# Patient Record
Sex: FEMALE | Race: WHITE | Employment: OTHER | URBAN - METROPOLITAN AREA
[De-identification: names, ages, dates, MRNs, and addresses within clinical notes are randomized per-mention and may not be internally consistent; named-entity substitution may affect disease eponyms.]

---

## 2018-03-01 ENCOUNTER — HOSPITAL ENCOUNTER (OUTPATIENT)
Dept: PHYSICAL THERAPY | Age: 83
Discharge: HOME | End: 2018-03-01
Payer: MEDICARE

## 2018-03-01 PROCEDURE — 97161 PT EVAL LOW COMPLEX 20 MIN: CPT

## 2018-03-01 PROCEDURE — G8978 MOBILITY CURRENT STATUS: HCPCS

## 2018-03-01 PROCEDURE — G8979 MOBILITY GOAL STATUS: HCPCS

## 2018-03-01 NOTE — PROGRESS NOTES
[address] PHYSICAL THERAPY   [facility], [address],[place], [address] - Phone: [phone]  Fax: 31-01-08-40 OF [facility] / [address] [facility]  Patient Name: Maya Carrillo : 1935   Medical   Diagnosis: Vestibular disorder [H81.90] Treatment Diagnosis: Vestibular disorder [H81.90], unsteadiness on feet [R26.81]   Onset Date: 1-2 years     Referral Source: Don Bonilla MD Start of Care [facility]): 3/1/2018   Prior Hospitalization: See medical history Provider #: [id number]   Prior Level of Function: Pt reports \"I walked normal\" prior to onset. No AD. Comorbidities: Arthritis, L TKA , HTN, h/o ovarian CA    Medications: Verified on Patient Summary List   The Plan of Care and following information is based on the information from the initial evaluation.   ===========================================================================================  Assessment / key information:  Pt is a [de-identified]y.o. year old female with subjective complaints of difficulty walking of insidious onset that started about 1-2 years ago. \"I just can't walk right\". Pt states she does not want to have to walk with AD. Pain is not a problem for which she is seeking treatment. Denies dizziness, vertigo, nausea, diplopia, tinnitus, paresthesias. Current functional limitations: walking, household chores. FOTO score= 64/100 indicating 36% impairment to functional activities. Today's evaulation is significant for   1) gait impairments: widened KEENAN, decreased gait speed, decreased eccentric loading and push off b/l; increased b/l trunk sway. 2) Impaired standing balance: Static:  Romberg EO/EC: floor= 30/3\", foam= 1/0\". SLS floor R 3\", L 1\". Dynamic: DGI=18/24 (min fall risk). 3) Impaired strength:  Hip flex R 3-/5, L 3-/5; abd R 2+/5, L 3-/5; Knee Ext R 4/5, L 4/5; flex R 4-/5, L 4-/5;  Ankle DF R 5/5, L 5/5.    4) Oculo-motor exam is grossly WNL.  (-) smooth pursuit, convergence, VOR, saccades. These findings are supportive for diagnosis of generalized balance impairment. Pt will be a good candidate for skilled PT to address these impairments and promote return to normal ADLs and functional mobility for improved quality of life.    ===========================================================================================  Eval Complexity: History MEDIUM  Complexity : 1-2 comorbidities / personal factors will impact the outcome/ POC ;  Examination  MEDIUM Complexity : 3 Standardized tests and measures addressing body structure, function, activity limitation and / or participation in recreation ; Presentation LOW Complexity : Stable, uncomplicated ;  Decision Making MEDIUM Complexity : FOTO score of 26-74; Overall Complexity LOW   Problem List: decrease strength, impaired gait/ balance, decrease ADL/ functional abilitiies, decrease activity tolerance, decrease flexibility/ joint mobility and decrease transfer abilities   Treatment Plan may include any combination of the following: Therapeutic exercise, Therapeutic activities, Neuromuscular re-education, Physical agent/modality, Gait/balance training, Manual therapy, Patient education and Functional mobility training  Patient / Family readiness to learn indicated by: asking questions, trying to perform skills and interest  Persons(s) to be included in education: patient (P)  Barriers to Learning/Limitations: yes; Language; Native Italian speaking  Measures taken:    Patient Goal (s): \"better balance\"   Patient self reported health status: good  Rehabilitation Potential: good   Short Term Goals: To be accomplished in  2  weeks:  1. Pt will be educated in appropriate HEP to improve LE strength/stability for amb, standing, and transfers. 2.  Patient will report at least 25% functional improvement with standing, walking ADL's.      Long Term Goals: To be accomplished in  4-6  weeks:  1.  Pt will improve FOTO score to >/= 74 to demo a significant improvement in functional activity tolerance. 2. Pt will achieve >/= +4 GROC in order to promote increased activity tolerance. 3. Patient to demonstrate >/=5 sec of b/ SLS on firm surface in order to improve ease with ambulation on uneven terrains. 4. Pt will increase DGI to >/= 20/24 in order to reduce risk for fall with ambulation. Frequency / Duration:   Patient to be seen  2  times per week for 4-6  weeks:  Patient / Caregiver education and instruction: activity modification and exercises  G-Codes (GP): Mobility [id number] Current  CJ= 20-39%   Goal  CJ= 20-39%. The severity rating is based on the FOTO Score  Therapist Signature: Elicia Ferris, PT Date: 7/6/7231   Certification Period: 03/01/18 to 5/30/2018 Time: 1:50 PM   ===========================================================================================  I certify that the above Physical Therapy Services are being furnished while the patient is under my care. I agree with the treatment plan and certify that this therapy is necessary. Physician Signature:        Date:       Time:     Please sign and return to [facility], [facility]. or you may fax the signed copy to [phone]. Thank you.

## 2018-03-01 NOTE — PROGRESS NOTES
PHYSICAL THERAPY - DAILY TREATMENT NOTE    Patient Name: Williams Jonas        Date: 3/1/2018  : 1935   yes Patient  Verified  Visit #:   1     Insurance: Payor: /      In time: 309 Out time: 340   Total Treatment Time: 31     Medicare Time Tracking (below)   Total Timed Codes (min):  31 1:1 Treatment Time:  31     TREATMENT AREA =  Vestibular disorder [H81.90]    SUBJECTIVE  Pain Level (on 0 to 10 scale):  5-neck  / 10   Medication Changes/New allergies or changes in medical history, any new surgeries or procedures?    no  If yes, update Summary List   Subjective Functional Status/Changes:  []  No changes reported     See POC          OBJECTIVE    See exam on chart for details on objective findings           min Patient Education:  yes   Pt educated in balance system anatomy, function and dysfunction as related to diagnosis. Reviewed POC and goals. Pt reports understanding. []  Progressed/Changed HEP based on: Other Objective/Functional Measures:    See POC     Post Treatment Pain Level (on 0 to 10) scale:   5  / 10     ASSESSMENT  Assessment/Changes in Function:     See POC     []  See Progress Note/Recertification   Patient will continue to benefit from skilled PT services to modify and progress therapeutic interventions, address functional mobility deficits, address ROM deficits, address strength deficits and address imbalance/dizziness to attain remaining goals. Progress toward goals / Updated goals:    See POC     PLAN  []  Upgrade activities as tolerated yes Continue plan of care   []  Discharge due to :    []  Other:      Therapist: Smita Jasso.  Jermaine Belcher, PT    Date: 3/1/2018 Time: 1:49 PM     Future Appointments  Date Time Provider Ivon Hall   3/7/2018 11:00 AM Gil Rascon, PT [facility]   3/9/2018 11:00 AM Gil Rascon, PT [facility]   3/14/2018 10:30 AM Gil Rascon, PT [facility]   3/16/2018 10:30 AM Gil Rascon, PT [facility]   3/19/2018 3:00 PM Gil Degroot Tarah, PT [facility]   3/21/2018 3:00 PM Mercedes Velasco, PT [facility]   3/26/2018 11:30 AM Brooks Rascon, PT [facility]   3/29/2018 11:00 AM Brooks Rascon, PT [facility]         \

## 2018-03-07 ENCOUNTER — HOSPITAL ENCOUNTER (OUTPATIENT)
Dept: PHYSICAL THERAPY | Age: 83
Discharge: HOME | End: 2018-03-07
Payer: MEDICARE

## 2018-03-07 PROCEDURE — 97112 NEUROMUSCULAR REEDUCATION: CPT

## 2018-03-07 NOTE — PROGRESS NOTES
PHYSICAL THERAPY - DAILY TREATMENT NOTE    Patient Name: Aden Wang        Date: 3/7/2018  : 1935   yes Patient  Verified  Visit #:     Insurance: Payor: Jelly Hsieh / Plan: [place] MED[place] & B / Product Type: Medicare /      In time: 1100 Out time: 1130   Total Treatment Time: 30     Medicare Time Tracking (below)   Total Timed Codes (min):  30 1:1 Treatment Time:  30     TREATMENT AREA =  Unsteadiness on feet [R26.81]    SUBJECTIVE  Pain Level (on 0 to 10 scale):  2  / 10   Medication Changes/New allergies or changes in medical history, any new surgeries or procedures?    no  If yes, update Summary List   Subjective Functional Status/Changes:  []  No changes reported     \"I feel the same as I always do\"          OBJECTIVE    30 min Neuromuscular Re-ed: SEE FLOW SHEET   Rationale:    improve coordination, increase proprioception and balance to improve the patients ability to perform functional ADL's         min Patient Education:  yes  Reviewed HEP   []  Progressed/Changed HEP based on: Other Objective/Functional Measures:    Initiated therex today per flow sheet, requiring vc and demo 100% of the time for proper form and technique. Post Treatment Pain Level (on 0 to 10) scale:   2  / 10     ASSESSMENT  Assessment/Changes in Function:     Required moderate encouragement to perform standing balance and therex. Pt continues to have most difficulty with standing and walking balance as well as with foam. Will continue to progress therex within current POC as patient is able.        []  See Progress Note/Recertification   Patient will continue to benefit from skilled PT services to modify and progress therapeutic interventions, address functional mobility deficits, address ROM deficits, address strength deficits, analyze and address soft tissue restrictions, analyze and cue movement patterns, analyze and modify body mechanics/ergonomics, assess and modify postural abnormalities and address imbalance/dizziness to attain remaining goals.    Progress toward goals / Updated goals:    First visit since IE     PLAN  []  Upgrade activities as tolerated yes Continue plan of care   []  Discharge due to :    []  Other:      Therapist: Calderon Giraldo PT, DPT    Date: 3/7/2018 Time: 8:54 AM     Future Appointments  Date Time Provider Ivon Isabel   3/7/2018 11:00 AM Derrick Rascon, PT [facility]   3/9/2018 11:00 AM Derrick Rascon, PT [address] [place] [address]   3/14/2018 10:30 AM Derrick Rascon, PT [facility]   3/16/2018 10:30 AM Derrick Rascon, PT [facility]   3/19/2018 3:00 PM Derrick Rascon, PT [facility]   3/21/2018 3:00 PM Derrick Rascon, PT [facility]   3/26/2018 11:30 AM Derrick Rascon, PT [facility]   3/29/2018 11:00 AM Derrick Rascon, PT [facility]

## 2018-03-09 ENCOUNTER — HOSPITAL ENCOUNTER (OUTPATIENT)
Dept: PHYSICAL THERAPY | Age: 83
Discharge: HOME | End: 2018-03-09
Payer: MEDICARE

## 2018-03-09 PROCEDURE — 97110 THERAPEUTIC EXERCISES: CPT

## 2018-03-09 PROCEDURE — 97112 NEUROMUSCULAR REEDUCATION: CPT

## 2018-03-09 NOTE — PROGRESS NOTES
PHYSICAL THERAPY - DAILY TREATMENT NOTE    Patient Name: Chemo Doan        Date: 3/9/2018  : 1935   yes Patient  Verified  Visit #:  3  of   12  Insurance: Payor: Azam Boone / Plan: [place] MEDICARE PART A & B / Product Type: Medicare /      In time: 1100 Out time: 1130   Total Treatment Time: 30     Medicare Time Tracking (below)   Total Timed Codes (min):  30 1:1 Treatment Time: 30     TREATMENT AREA =  Unsteadiness on feet [R26.81]    SUBJECTIVE  Pain Level (on 0 to 10 scale): 0  / 10   Medication Changes/New allergies or changes in medical history, any new surgeries or procedures?    no  If yes, update Summary List   Subjective Functional Status/Changes:  []  No changes reported     \"I feel line\"         OBJECTIVE    30 min Neuromuscular Re-ed: SEE FLOW SHEET   Rationale:    improve coordination, increase proprioception and balance to improve the patients ability to perform functional ADL's         min Patient Education:  yes  Reviewed HEP   []  Progressed/Changed HEP based on: Other Objective/Functional Measures:    Pt was able to add standing rom on the foam with EC today. Required CGA for all balance and walking therex today. Will continue to progress therex within current POC as patient is able. Post Treatment Pain Level (on 0 to 10) scale:  0  / 10     ASSESSMENT  Assessment/Changes in Function:     Pt denies any sharp pains or red flags following tx         []  See Progress Note/Recertification   Patient will continue to benefit from skilled PT services to modify and progress therapeutic interventions, address functional mobility deficits, address ROM deficits, address strength deficits, analyze and address soft tissue restrictions, analyze and cue movement patterns, analyze and modify body mechanics/ergonomics, assess and modify postural abnormalities and address imbalance/dizziness to attain remaining goals.    Progress toward goals / Updated goals:         PLAN  []  Upgrade activities as tolerated yes Continue plan of care   []  Discharge due to :    []  Other:      Therapist: Nicholas Clay, PT, DPT    Date: 3/9/2018 Time: 8:54 AM     Future Appointments  Date Time Provider Ivon Hall   3/9/2018 11:00 AM Feliberto Rascon PT [facility]   3/14/2018 10:30 AM Feliberto Rascon PT [address] [place] [address]   3/16/2018 10:30 AM Feliberto Rascon PT [facility]   3/19/2018 3:00 PM Feliberto Rascon PT [facility]   3/21/2018 3:00 PM Pauline Pickard, PT [facility]   3/26/2018 11:30 AM Feliberto Rascon PT [facility]   3/29/2018 11:00 AM Feliberto Rascon PT [facility]

## 2018-03-14 ENCOUNTER — HOSPITAL ENCOUNTER (OUTPATIENT)
Dept: PHYSICAL THERAPY | Age: 83
Discharge: HOME | End: 2018-03-14
Payer: MEDICARE

## 2018-03-14 PROCEDURE — 97112 NEUROMUSCULAR REEDUCATION: CPT

## 2018-03-14 NOTE — PROGRESS NOTES
PHYSICAL THERAPY - DAILY TREATMENT NOTE    Patient Name: Jamey Reyes        Date: 3/14/2018  : 1935   yes Patient  Verified  Visit #:    Insurance: Payor: Bakari Gurrola / Plan: [place] MEDICARE PART A & B / Product Type: Medicare /      In time: 9891 Out time:    Total Treatment Time: 25     Medicare Time Tracking (below)   Total Timed Codes (min):  25 1:1 Treatment Time: 25     TREATMENT AREA =  Unsteadiness on feet [R26.81]    SUBJECTIVE  Pain Level (on 0 to 10 scale): 0 / 10   Medication Changes/New allergies or changes in medical history, any new surgeries or procedures?    no  If yes, update Summary List   Subjective Functional Status/Changes:  []  No changes reported     \"I feel the same\"         OBJECTIVE    25 min Neuromuscular Re-ed: SEE FLOW SHEET   Rationale:    improve coordination, increase proprioception and balance to improve the patients ability to perform functional ADL's         min Patient Education:  yes  Reviewed HEP   []  Progressed/Changed HEP based on: Other Objective/Functional Measures:    See flow sheet for therex         Post Treatment Pain Level (on 0 to 10) scale:  0 / 10     ASSESSMENT  Assessment/Changes in Function:     Pt continues to have decreased awareness of balance issues and continues to make no noted adjustments with both verbal and tactile cuing. Pt was educated on importance of balance and gait, mainly head turns at this time and continued to no perform head turns like educated. Will continue to progress therex within current POC as patient is able.            []  See Progress Note/Recertification   Patient will continue to benefit from skilled PT services to modify and progress therapeutic interventions, address functional mobility deficits, address ROM deficits, address strength deficits, analyze and address soft tissue restrictions, analyze and cue movement patterns, analyze and modify body mechanics/ergonomics, assess and modify postural abnormalities and address imbalance/dizziness to attain remaining goals.    Progress toward goals / Updated goals:         PLAN  []  Upgrade activities as tolerated yes Continue plan of care   []  Discharge due to :    []  Other:      Therapist: Ciara Gracia PT, DPT    Date: 3/14/2018 Time: 11:00     Future Appointments  Date Time Provider Ivon Hall   3/14/2018 10:30 AM James Rascon PT [facility]   3/16/2018 10:30 AM James Rascon PT [address] [place] [address]   3/19/2018 3:00 PM James Rascon PT [facility]   3/21/2018 3:00 PM Andreia Ardon, PT [facility]   3/26/2018 11:30 AM James Rascon PT [facility]   3/29/2018 11:00 AM James Rascon PT [facility]

## 2018-03-16 ENCOUNTER — HOSPITAL ENCOUNTER (OUTPATIENT)
Dept: PHYSICAL THERAPY | Age: 83
Discharge: HOME | End: 2018-03-16
Payer: MEDICARE

## 2018-03-16 PROCEDURE — 97112 NEUROMUSCULAR REEDUCATION: CPT

## 2018-03-16 NOTE — PROGRESS NOTES
PHYSICAL THERAPY - DAILY TREATMENT NOTE    Patient Name: April Loreta        Date: 3/16/2018  : 1935   yes Patient  Verified  Visit #:    Insurance: Payor: Alejandro Macdonald / Plan: [place] MEDICARE PART A & B / Product Type: Medicare /      In time:  Out time:    Total Treatment Time: 28     Medicare Time Tracking (below)   Total Timed Codes (min):  28 1:1 Treatment Time: 28     TREATMENT AREA =  Unsteadiness on feet [R26.81]    SUBJECTIVE  Pain Level (on 0 to 10 scale): 0 / 10   Medication Changes/New allergies or changes in medical history, any new surgeries or procedures?    no  If yes, update Summary List   Subjective Functional Status/Changes:  []  No changes reported     \"I dont feel no different. I have to still do all my stuff at home. I dont have a choice\"         OBJECTIVE    28 min Neuromuscular Re-ed: SEE FLOW SHEET   Rationale:    improve coordination, increase proprioception and balance to improve the patients ability to perform functional ADL's         min Patient Education:  yes  Reviewed HEP   []  Progressed/Changed HEP based on: Other Objective/Functional Measures:    Max cuing required for proper form with nolvia negotiation today  Continues to have no change in head turns, even with max verbal cuing. Most decrease in balance with vertical head motions with moderate deviation from path       Post Treatment Pain Level (on 0 to 10) scale:  0 / 10     ASSESSMENT  Assessment/Changes in Function:     Pt reports no noted changes in balance over the last 5 visit. Pt was educated extensively on the possible use of a cane for improved safety with dynamic gait activities, however was addiment about not wanting to use a cane. Pt was also educated on importance of HEP for LE strengthening to improve balance and curb negotiation etc. Pt reported HEP \"maybe 1x/day if I can\".  Pt will be re-evaluated [place] and will likely be DC in the upcoming weeks due to minimal changes towards goals and balance deficits at this time. []  See Progress Note/Recertification   Patient will continue to benefit from skilled PT services to modify and progress therapeutic interventions, address functional mobility deficits, address ROM deficits, address strength deficits, analyze and address soft tissue restrictions, analyze and cue movement patterns, analyze and modify body mechanics/ergonomics, assess and modify postural abnormalities and address imbalance/dizziness to attain remaining goals.    Progress toward goals / Updated goals:         PLAN  []  Upgrade activities as tolerated yes Continue plan of care   []  Discharge due to :    []  Other:      Therapist: Lamin Langley PT, DPT    Date: 3/16/2018 Time: 10:30AM     Future Appointments  Date Time Provider Ivon Hall   3/16/2018 10:30 AM Darlin Rascon, PT [facility]   3/19/2018 3:00 PM Darlin Rascon, PT [facility]   3/21/2018 3:00 PM Saulo Perea, PT [facility]   3/26/2018 11:30 AM Darlin Rascon, PT [facility]   3/29/2018 11:00 AM Darlin Rascon, PT [facility]

## 2018-03-19 ENCOUNTER — HOSPITAL ENCOUNTER (OUTPATIENT)
Dept: PHYSICAL THERAPY | Age: 83
Discharge: HOME | End: 2018-03-19
Payer: MEDICARE

## 2018-03-19 PROCEDURE — 97110 THERAPEUTIC EXERCISES: CPT

## 2018-03-19 PROCEDURE — 97112 NEUROMUSCULAR REEDUCATION: CPT

## 2018-03-19 NOTE — PROGRESS NOTES
PHYSICAL THERAPY - DAILY TREATMENT NOTE    Patient Name: Kristel Teran        Date: 3/19/2018  : 1935   yes Patient  Verified  Visit #:    Insurance: Payor: Ajay Palma / Plan: [place] MEDICARE PART A & B / Product Type: Medicare /      In time: 250 Out time: 330   Total Treatment Time: 40     Medicare Time Tracking (below)   Total Timed Codes (min):  40 1:1 Treatment Time: 40     TREATMENT AREA =  Unsteadiness on feet [R26.81]    SUBJECTIVE  Pain Level (on 0 to 10 scale): 0 / 10   Medication Changes/New allergies or changes in medical history, any new surgeries or procedures?    no  If yes, update Summary List   Subjective Functional Status/Changes:  []  No changes reported     \"I think I may be getting better I dont know\"         OBJECTIVE    10 min Therapeutic Exercise:  []  See flow sheet :   Rationale: increase strength and improve balance  to improve the patients ability to perform functional ADL's    30 min Neuromuscular Re-ed: Reassessment of DGI, SLS, and goals. See flow sheet. Rationale:    improve coordination, increase proprioception and balance to improve the patients ability to perform functional ADL's       min Patient Education:  yes  Reviewed HEP   []  Progressed/Changed HEP based on: Other Objective/Functional Measures: FOTO: ND  GROC: ND  DGI: 18/24  SLS: 2 seconds with UE support       Post Treatment Pain Level (on 0 to 10) scale:  0 / 10     ASSESSMENT  Assessment/Changes in Function:     Main 1:1 time in therapy today spent performing reassessment of DGI and assessing goals for progress at this time. See below. Pt continues to state \"I feel about the same. I still have to do what I have to do at home. \" Pt was given updated pictures and detailed explanation of HEP and verbalized understanding of needing to perform balance in corner with chair in front for increased safety.  WIll likely be seen 3 remaining visits then DC.            []  See Progress Note/Recertification Patient will continue to benefit from skilled PT services to modify and progress therapeutic interventions, address functional mobility deficits, address ROM deficits, address strength deficits, analyze and address soft tissue restrictions, analyze and cue movement patterns, analyze and modify body mechanics/ergonomics, assess and modify postural abnormalities and address imbalance/dizziness to attain remaining goals. Progress toward goals / Updated goals: · Short Term Goals: To be accomplished in  2  weeks:  1. Pt will be educated in appropriate HEP to improve LE strength/stability for amb, standing, and transfers- goal progressing  2. Patient will report at least 25% functional improvement with standing, walking ADL's- goal not met, pt denies any changes in balance at this time and states \"it feels about the same\"     · Long Term Goals: To be accomplished in  4-6  weeks:  1. Pt will improve FOTO score to >/= 74 to demo a significant improvement in functional activity tolerance- will assess at DC  2. Pt will achieve >/= +4 GROC in order to promote increased activity tolerance- will assess at DC  3. Patient to demonstrate >/=5 sec of b/ SLS on firm surface in order to improve ease with ambulation on uneven terrains- goal not met, 2 seconds   4.  Pt will increase DGI to >/= 20/24 in order to reduce risk for fall with ambulation- goal not met 18/24          PLAN  []  Upgrade activities as tolerated yes Continue plan of care   []  Discharge due to :    []  Other:      Therapist: Ugo Hook, PT, DPT    Date: 3/19/2018 Time: 3:30PM     Future Appointments  Date Time Provider Ivon Hall   3/19/2018 3:00 PM Waqar Rascon, PT [facility]   3/21/2018 3:00 PM Mili Escobar PT [facility]   3/26/2018 11:30 AM Waqar Rascon PT [facility]   3/29/2018 11:00 AM Waqar Rascon, PT [facility]

## 2018-03-21 ENCOUNTER — HOSPITAL ENCOUNTER (OUTPATIENT)
Dept: PHYSICAL THERAPY | Age: 83
Discharge: HOME | End: 2018-03-21
Payer: MEDICARE

## 2018-03-21 PROCEDURE — 97112 NEUROMUSCULAR REEDUCATION: CPT

## 2018-03-21 PROCEDURE — 97110 THERAPEUTIC EXERCISES: CPT

## 2018-03-21 NOTE — PROGRESS NOTES
PHYSICAL THERAPY - DAILY TREATMENT NOTE    Patient Name: Aram Sosa        Date: 3/21/2018  : 1935   yes Patient  Verified  Visit #:    Insurance: Payor: Enedelia Flaherty / Plan: [place] / Product Type: Medicare /      In time: 300 Out time: 334   Total Treatment Time: 34     Medicare Time Tracking (below)   Total Timed Codes (min):  34 1:1 Treatment Time: 34     TREATMENT AREA =  Unsteadiness on feet [R26.81]    SUBJECTIVE  Pain Level (on 0 to 10 scale): 0 / 10   Medication Changes/New allergies or changes in medical history, any new surgeries or procedures?    no  If yes, update Summary List   Subjective Functional Status/Changes:  []  No changes reported     \"I feel fine\"         OBJECTIVE    10 min Therapeutic Exercise:  []  See flow sheet :   Rationale: increase strength and improve balance  to improve the patients ability to perform functional ADL's    24 min Neuromuscular Re-ed:  See flow sheet. Rationale:    improve coordination, increase proprioception and balance to improve the patients ability to perform *functional ADL's       min Patient Education:  yes  Reviewed HEP   []  Progressed/Changed HEP based on: Other Objective/Functional Measures:    See flow sheet       Post Treatment Pain Level (on 0 to 10) scale:  0 / 10     ASSESSMENT  Assessment/Changes in Function:   Continues to have decrease balance awareness and continues to present with mild plateau in therapy. Will be seen 2 visits next week then DC to HEP.       []  See Progress Note/Recertification   Patient will continue to benefit from skilled PT services to modify and progress therapeutic interventions, address functional mobility deficits, address ROM deficits, address strength deficits, analyze and address soft tissue restrictions, analyze and cue movement patterns, analyze and modify body mechanics/ergonomics, assess and modify postural abnormalities and address imbalance/dizziness to attain remaining goals.    Progress toward goals / Updated goals:       PLAN  []  Upgrade activities as tolerated yes Continue plan of care   []  Discharge due to :    []  Other:      Therapist: Seun Ugarte PT, DPT    Date: 3/21/2018 Time: 3:30PM     Future Appointments  Date Time Provider Ivon Hall   3/21/2018 3:00 PM Deisy Mohan PT [facility]   3/26/2018 11:30 AM Jose Luis Rascon, [facility]   3/29/2018 11:00 AM Jose Luis Rascon, [facility]

## 2018-03-26 ENCOUNTER — HOSPITAL ENCOUNTER (OUTPATIENT)
Dept: PHYSICAL THERAPY | Age: 83
Discharge: HOME | End: 2018-03-26
Payer: MEDICARE

## 2018-03-26 PROCEDURE — 97112 NEUROMUSCULAR REEDUCATION: CPT

## 2018-03-26 PROCEDURE — 97110 THERAPEUTIC EXERCISES: CPT

## 2018-03-29 ENCOUNTER — HOSPITAL ENCOUNTER (OUTPATIENT)
Dept: PHYSICAL THERAPY | Age: 83
Discharge: HOME | End: 2018-03-29
Payer: MEDICARE

## 2018-03-29 PROCEDURE — G8979 MOBILITY GOAL STATUS: HCPCS

## 2018-03-29 PROCEDURE — 97110 THERAPEUTIC EXERCISES: CPT

## 2018-03-29 PROCEDURE — G8978 MOBILITY CURRENT STATUS: HCPCS

## 2018-03-29 NOTE — PROGRESS NOTES
[facility] PHYSICAL THERAPY  [address] [place] [address],[facility], [facility] - Phone: [phone]  Fax: [phone] FOR PHYSICAL THERAPY          Patient Name: Kiet Hamilton : 1935   Treatment/Medical Diagnosis: Unsteadiness on feet [R26.81]   Onset Date: 1-2 years    Referral Source: Krishan Laguna MD [facility]): 3/1/18   Prior Hospitalization: See Medical History Provider #: [id number]   Prior Level of Function: No AD, \"I walked normal\" reported prior to onset of symptoms   Comorbidities: Arthritis, L TKA , HTN, h/o ovarian CA    Medications: Verified on Patient Summary List   Visits from [facility]: 9 Missed Visits: 0     · Short Term Goals: To be accomplished in  2  weeks:  1. Pt will be educated in appropriate HEP to improve LE strength/stability for amb, standing, and transfers- goal met  2. Patient will report at least 25% functional improvement with standing, walking ADL's- goal met     · Long Term Goals: To be accomplished in  4-6  weeks:  1. Pt will improve FOTO score to >/= 74 to demo a significant improvement in functional activity tolerance- goal met  2. Pt will achieve >/= +4 GROC in order to promote increased activity tolerance- goal not met +2 reported  3. Patient to demonstrate >/=5 sec of b/ SLS on firm surface in order to improve ease with ambulation on uneven terrains- goal progressing, continues to require light UE use for balance on the   4. Pt will increase DGI to >/= 20/24 in order to reduce risk for fall with ambulation- goal not met , no improvement since IE     Key Functional Changes/Progress:  Pt has been seen for 9 visits since IE on 3/1/18. Pt reports 30% overall improvement since beginning therapy. Pt reports average pain 0/10 at this time. Therex has focused on improving patients standing and walking balance, to improve safety and decrease risk of falls. Pt denies any falls since beginning therapy. Pt continues to have mild difficulty with gait with head turns and mild gait deviations, but no LOB. Pt is able to perform SLS for 5 seconds with no UE support in order to perform stair and curb negotiation. . Pt has reached max medical benefit in therapy at this time and will be given HEP to continue upon DC. Pt will be DC to long term HEP at this time and has reached the following goals listed below:    G-Codes (GP): Mobility  [id number] Goal  CJ= 20-39%  D/C  CJ= 20-39%. The severity rating is based on the FOTO Score      Assessments/Recommendations: Other: Patient has reached maximum benefit from PT at this time and has been instruacted in proper continuation of HEP at this time. Pt will be DC from PT. If you have any questions/comments please contact us directly at [phone]. Thank you for allowing us to assist in the care of your patient.     Therapist Signature: Ben Kang PT, DPT   Date: 3/29/2018   Reporting Period: 3/1/18-3/29/2018 Time: 7:18 AM

## 2018-03-29 NOTE — PROGRESS NOTES
PHYSICAL THERAPY - DAILY TREATMENT NOTE    Patient Name: Malena De La Torre        Date: 3/29/2018  : 1935   yes Patient  Verified  Visit #:    Insurance: Payor: Yaihr Ward / Plan: [place] MEDICARE PART A & B / Product Type: Medicare /      In time: 1100 Out time: 1130   Total Treatment Time: 30     Medicare Time Tracking (below)   Total Timed Codes (min):  30 1:1 Treatment Time: 30     TREATMENT AREA =  Unsteadiness on feet [R26.81]    SUBJECTIVE  Pain Level (on 0 to 10 scale): 0 / 10   Medication Changes/New allergies or changes in medical history, any new surgeries or procedures?    no  If yes, update Summary List   Subjective Functional Status/Changes:  []  No changes reported     SEE DC         OBJECTIVE    30 min Therapeutic Exercise:  []  See flow sheet :   Rationale: increase strength and improve balance  to improve the patients ability to perform functional ADL's         min Patient Education:  yes  Reviewed HEP   []  Progressed/Changed HEP based on:  Pt and PT spent majority of therapy with review of long term HEP to continue upon DC. Pt verbalized and demonstrated good understanding. Pt was told to perform balance standing in the corner with chair in front to increase safety with balance HEP. Pt verbalized and demonstrated understanding. Other Objective/Functional Measures:    SEE DC SUMMARY     Post Treatment Pain Level (on 0 to 10) scale:  0 / 10     ASSESSMENT  Assessment/Changes in Function:   SEE DC     []  See Progress Note/Recertification      Progress toward goals / Updated goals:    SEE DC     PLAN  []  Upgrade activities as tolerated no Continue plan of care   []  Discharge due to : Reaching max medical benefit from PT at this time.    []  Other:      Therapist: Torey Salas, PT, DPT    Date: 3/29/2018 Time: 3:30PM     Future Appointments  Date Time Provider Ivon Hall   3/29/2018 11:00 AM Araseli Rascon, PT [facility]